# Patient Record
Sex: MALE | Race: WHITE | NOT HISPANIC OR LATINO | Employment: PART TIME | URBAN - NONMETROPOLITAN AREA
[De-identification: names, ages, dates, MRNs, and addresses within clinical notes are randomized per-mention and may not be internally consistent; named-entity substitution may affect disease eponyms.]

---

## 2017-05-24 ENCOUNTER — HOSPITAL ENCOUNTER (EMERGENCY)
Facility: HOSPITAL | Age: 22
Discharge: LEFT BEFORE DISCHARGE | End: 2017-05-24

## 2017-05-24 VITALS
OXYGEN SATURATION: 95 % | BODY MASS INDEX: 18.28 KG/M2 | DIASTOLIC BLOOD PRESSURE: 69 MMHG | HEART RATE: 80 BPM | RESPIRATION RATE: 18 BRPM | SYSTOLIC BLOOD PRESSURE: 108 MMHG | HEIGHT: 72 IN | WEIGHT: 135 LBS | TEMPERATURE: 97.5 F

## 2017-06-02 ENCOUNTER — HOSPITAL ENCOUNTER (EMERGENCY)
Facility: HOSPITAL | Age: 22
Discharge: HOME | End: 2017-06-02
Attending: STUDENT IN AN ORGANIZED HEALTH CARE EDUCATION/TRAINING PROGRAM | Admitting: STUDENT IN AN ORGANIZED HEALTH CARE EDUCATION/TRAINING PROGRAM

## 2017-06-02 VITALS
OXYGEN SATURATION: 96 % | HEART RATE: 71 BPM | BODY MASS INDEX: 18.28 KG/M2 | TEMPERATURE: 97.6 F | DIASTOLIC BLOOD PRESSURE: 59 MMHG | WEIGHT: 135 LBS | SYSTOLIC BLOOD PRESSURE: 111 MMHG | HEIGHT: 72 IN | RESPIRATION RATE: 16 BRPM
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
## 2017-06-02 PROCEDURE — 99282 EMERGENCY DEPT VISIT SF MDM: CPT

## 2017-06-02 RX ORDER — DOXYCYCLINE 100 MG/1
100 CAPSULE ORAL 2 TIMES DAILY
Qty: 20 CAPSULE | Refills: 0 | Status: SHIPPED | OUTPATIENT
Start: 2017-06-02

## 2017-06-02 NOTE — ED PROVIDER NOTES
Subjective   HPI Comments: 22-year-old male presents after noticing a tick bite over the left chest wall about 2 weeks ago.  He said that initially he pulled the body out in a few days later his girlfriend was able to pull the head out.  He is had a little bit of a persistent red area and he says he squeezes the area and a little bit of pus will come out almost every day.  He denies any pain, fever, chills, myalgias, vomiting or diarrhea.  He says he is typically healthy and is on no regularly prescribed medicines.  He just was concerned about the drainage that he is able to squeeze out every day.  He did not notice any other bites.  This occurred here locally.  He does not have a primary care provider      Review of Systems   Constitutional: Negative.    HENT: Negative.    Eyes: Negative.    Respiratory: Negative.    Cardiovascular: Negative.    Gastrointestinal: Negative.    Endocrine: Negative.    Genitourinary: Negative.    Musculoskeletal: Negative.  Negative for arthralgias, back pain, gait problem and joint swelling.   Skin: Positive for color change.   Allergic/Immunologic: Negative for immunocompromised state.   Neurological: Negative.    Hematological: Negative.    Psychiatric/Behavioral: Negative.    All other systems reviewed and are negative.      Past Medical History:   Diagnosis Date   • Pneumothorax     right lung       No Known Allergies    Past Surgical History:   Procedure Laterality Date   • APPENDECTOMY         History reviewed. No pertinent family history.    Social History     Social History   • Marital status: Single     Spouse name: N/A   • Number of children: N/A   • Years of education: N/A     Social History Main Topics   • Smoking status: Never Smoker   • Smokeless tobacco: Current User     Types: Snuff   • Alcohol use No   • Drug use: No   • Sexual activity: Not Asked     Other Topics Concern   • None     Social History Narrative   • None           Objective   Physical Exam    Constitutional: He is oriented to person, place, and time. He appears well-developed and well-nourished. No distress.   He is a thin  male who does not appear acutely ill   HENT:   Head: Normocephalic and atraumatic.   Eyes: EOM are normal. No scleral icterus.   Neck: Normal range of motion.   Cardiovascular: Normal rate, regular rhythm and normal heart sounds.    No murmur heard.  Pulmonary/Chest: Effort normal and breath sounds normal.   Abdominal: Soft. Bowel sounds are normal.   Musculoskeletal: Normal range of motion. He exhibits no edema, tenderness or deformity.   Lymphadenopathy:     He has no cervical adenopathy.   Neurological: He is alert and oriented to person, place, and time.   Skin: Skin is warm and dry. He is not diaphoretic.   He has a mildly erythematous nonraised oval patch anterior left chest wall below the breast.  There is a 1 mm central scabbed area.  There is no fluctuance nothing draining and I squeeze the area not able to express anything.  There is no local cellulitis no annular ring-bull's-eye pattern.  No tenderness   Nursing note and vitals reviewed.      Procedures         ED Course  ED Course   Comment By Time   Patient presents with known tick bite 2 weeks ago and he was able to remove the entire tick.  He is not ill-appearing but says he was able to squeeze purulent material from the area.  There is no fluctuance in absolutely no abscess at this point.  I will go ahead and place him on doxycycline and have advised she follow up at [facility] Abe Adkins PA-C 06/02 1549                  [facility]    Final diagnoses:   Tick bite, initial encounter            Abe Adkins PA-C  06/02/17 0571

## 2017-08-16 ENCOUNTER — OFFICE VISIT (OUTPATIENT)
Dept: RETAIL CLINIC | Facility: CLINIC | Age: 22
End: 2017-08-16

## 2017-08-16 DIAGNOSIS — Z02.83 ENCOUNTER FOR DRUG SCREENING: Primary | ICD-10-CM
